# Patient Record
Sex: FEMALE | RURAL
[De-identification: names, ages, dates, MRNs, and addresses within clinical notes are randomized per-mention and may not be internally consistent; named-entity substitution may affect disease eponyms.]

---

## 2023-07-28 ENCOUNTER — APPOINTMENT (OUTPATIENT)
Dept: RURAL CLINIC 12 | Age: 28
Setting detail: DERMATOLOGY
End: 2023-07-31

## 2023-07-28 DIAGNOSIS — Z71.89 OTHER SPECIFIED COUNSELING: ICD-10-CM

## 2023-07-28 DIAGNOSIS — L92.0 GRANULOMA ANNULARE: ICD-10-CM

## 2023-07-28 PROCEDURE — OTHER PHOTO-DOCUMENTATION: OTHER

## 2023-07-28 PROCEDURE — OTHER INTRALESIONAL KENALOG: OTHER

## 2023-07-28 PROCEDURE — 99202 OFFICE O/P NEW SF 15 MIN: CPT | Mod: 25

## 2023-07-28 PROCEDURE — OTHER PRESCRIPTION: OTHER

## 2023-07-28 PROCEDURE — OTHER MIPS QUALITY: OTHER

## 2023-07-28 PROCEDURE — 11900 INJECT SKIN LESIONS </W 7: CPT

## 2023-07-28 PROCEDURE — OTHER COUNSELING: OTHER

## 2023-07-28 RX ORDER — CLOBETASOL PROPIONATE 0.5 MG/G
CREAM TOPICAL BID
Qty: 60 | Refills: 0 | Status: ERX | COMMUNITY
Start: 2023-07-28

## 2023-07-28 ASSESSMENT — LOCATION SIMPLE DESCRIPTION DERM
LOCATION SIMPLE: LEFT ANKLE
LOCATION SIMPLE: LEFT FOOT

## 2023-07-28 ASSESSMENT — LOCATION ZONE DERM
LOCATION ZONE: FEET
LOCATION ZONE: LEG

## 2023-07-28 ASSESSMENT — LOCATION DETAILED DESCRIPTION DERM
LOCATION DETAILED: LEFT LATERAL MALLEOLUS
LOCATION DETAILED: LEFT ANKLE

## 2023-07-28 NOTE — PROCEDURE: COUNSELING
Detail Level: Zone
Patient Specific Counseling (Will Not Stick From Patient To Patient): BIOPSY IN RESERVE IF NOT IMPROVING